# Patient Record
Sex: MALE | URBAN - METROPOLITAN AREA
[De-identification: names, ages, dates, MRNs, and addresses within clinical notes are randomized per-mention and may not be internally consistent; named-entity substitution may affect disease eponyms.]

---

## 2020-05-14 ENCOUNTER — OFFICE VISIT (OUTPATIENT)
Dept: URBAN - METROPOLITAN AREA CLINIC 75 | Facility: CLINIC | Age: 60
End: 2020-05-14

## 2020-05-14 DIAGNOSIS — T15.01XD FB IN CORNEA OF RIGHT EYE, SUBSEQUENT ENCOUNTER: Primary | ICD-10-CM

## 2020-05-14 PROCEDURE — 65222 REMOVE FOREIGN BODY FROM EYE: CPT | Performed by: OPTOMETRIST

## 2020-05-14 PROCEDURE — 99203 OFFICE O/P NEW LOW 30 MIN: CPT | Performed by: OPTOMETRIST

## 2024-03-27 NOTE — IMPRESSION/PLAN
Impression: FB in cornea of right eye, subsequent encounter: T15.01xD. Plan: INSTILLED ONE DROP OF PROPARICAINE IN THE RIGHT EYE, FB RIGHT EYE, REMOVED FOREIGN BODY  IN CLINIC, REMOVED RUST RING AT TODAYS VISIT 
START CIPRO QID OD ONLY ( PCP GAVE PT A RX) RECOMMEND A/T QID  INSTILLED A BCL AT TODAY VISIT WILL REMOVE IT ON TUESDAY What Type Of Note Output Would You Prefer (Optional)?: Bullet Format How Severe Are Your Spot(S)?: moderate Have Your Spot(S) Been Treated In The Past?: has not been treated Hpi Title: Evaluation of a Skin Lesion Additional History: FBSE